# Patient Record
Sex: FEMALE | Race: WHITE | NOT HISPANIC OR LATINO | Employment: FULL TIME | URBAN - METROPOLITAN AREA
[De-identification: names, ages, dates, MRNs, and addresses within clinical notes are randomized per-mention and may not be internally consistent; named-entity substitution may affect disease eponyms.]

---

## 2017-01-03 ENCOUNTER — TELEPHONE (OUTPATIENT)
Dept: OBSTETRICS AND GYNECOLOGY | Facility: CLINIC | Age: 27
End: 2017-01-03

## 2017-01-03 NOTE — TELEPHONE ENCOUNTER
----- Message from Prerna Dias sent at 1/3/2017 10:46 AM CST -----  Contact: patient  Patient called and stated she is having issue with her Mirena. She would like to be worked in tomorrow. She can be contacted at [phone].    Thanks,  Prerna

## 2017-01-03 NOTE — TELEPHONE ENCOUNTER
"Pt states that her Mirena is "poking her." and then the phone disconnected. I need to room a pt right now and then I will call the pt back.  "

## 2017-01-04 NOTE — TELEPHONE ENCOUNTER
"Pt has appt scheduled this Friday 01/06/2017. Pt states that she has "some discomfort when she lays down to go to sleep, walks a long time, and has some lower back pain." Advised pt to discuss this at her upcoming appt. Pt voiced understanding.  "

## 2017-02-16 ENCOUNTER — OFFICE VISIT (OUTPATIENT)
Dept: OBSTETRICS AND GYNECOLOGY | Facility: CLINIC | Age: 27
End: 2017-02-16
Payer: MEDICAID

## 2017-02-16 VITALS
BODY MASS INDEX: 45.99 KG/M2 | HEIGHT: 67 IN | DIASTOLIC BLOOD PRESSURE: 80 MMHG | WEIGHT: 293 LBS | SYSTOLIC BLOOD PRESSURE: 132 MMHG

## 2017-02-16 DIAGNOSIS — B37.31 VULVOVAGINAL CANDIDIASIS: Primary | ICD-10-CM

## 2017-02-16 DIAGNOSIS — R30.0 DYSURIA: ICD-10-CM

## 2017-02-16 DIAGNOSIS — Z30.431 IUD SURVEILLANCE: ICD-10-CM

## 2017-02-16 LAB
AMORPH CRY UR QL COMP ASSIST: NORMAL
BACTERIA #/AREA URNS AUTO: NORMAL /HPF
BILIRUB UR QL STRIP: NEGATIVE
CLARITY UR REFRACT.AUTO: ABNORMAL
COLOR UR AUTO: YELLOW
GLUCOSE UR QL STRIP: ABNORMAL
HGB UR QL STRIP: ABNORMAL
KETONES UR QL STRIP: NEGATIVE
LEUKOCYTE ESTERASE UR QL STRIP: ABNORMAL
MICROSCOPIC COMMENT: NORMAL
NITRITE UR QL STRIP: NEGATIVE
PH UR STRIP: 6 [PH] (ref 5–8)
PROT UR QL STRIP: NEGATIVE
RBC #/AREA URNS AUTO: 0 /HPF (ref 0–4)
SP GR UR STRIP: 1.02 (ref 1–1.03)
URN SPEC COLLECT METH UR: ABNORMAL
UROBILINOGEN UR STRIP-ACNC: NEGATIVE EU/DL
WBC #/AREA URNS AUTO: 0 /HPF (ref 0–5)
YEAST UR QL AUTO: NORMAL

## 2017-02-16 PROCEDURE — 81001 URINALYSIS AUTO W/SCOPE: CPT

## 2017-02-16 PROCEDURE — 99999 PR PBB SHADOW E&M-EST. PATIENT-LVL II: CPT | Mod: PBBFAC,,, | Performed by: OBSTETRICS & GYNECOLOGY

## 2017-02-16 PROCEDURE — 99213 OFFICE O/P EST LOW 20 MIN: CPT | Mod: S$PBB,,, | Performed by: OBSTETRICS & GYNECOLOGY

## 2017-02-16 PROCEDURE — 87086 URINE CULTURE/COLONY COUNT: CPT

## 2017-02-16 PROCEDURE — 99212 OFFICE O/P EST SF 10 MIN: CPT | Mod: PBBFAC | Performed by: OBSTETRICS & GYNECOLOGY

## 2017-02-16 RX ORDER — FLUCONAZOLE 150 MG/1
150 TABLET ORAL ONCE
Qty: 1 TABLET | Refills: 0 | Status: SHIPPED | OUTPATIENT
Start: 2017-02-16 | End: 2017-02-16

## 2017-02-16 RX ORDER — LINAGLIPTIN 5 MG/1
TABLET, FILM COATED ORAL
Refills: 2 | COMMUNITY
Start: 2017-01-14 | End: 2018-06-05

## 2017-02-16 RX ORDER — OMEPRAZOLE 40 MG/1
40 CAPSULE, DELAYED RELEASE ORAL EVERY MORNING
COMMUNITY
End: 2018-06-05

## 2017-02-16 RX ORDER — TRAZODONE HYDROCHLORIDE 150 MG/1
150 TABLET ORAL NIGHTLY
Refills: 1 | COMMUNITY
Start: 2017-01-12 | End: 2018-06-05

## 2017-02-16 NOTE — PROGRESS NOTES
Subjective:       Patient ID: Haley Negron is a 26 y.o. female.    Chief Complaint:  string check (Mirena placed 2016.)      History of Present Illness  HPI  Presents for string check following Mirena IUD insertion 16.  Pt chose Mirena for heavy menstrual bleeding.  She denies any vaginal bleeding, but reports midline pelvic cramping, and low back discomfort along with vulvar burning when she urinates.  She also has an itchy, burning rash on the vulva.  Has been applying baby powder to the area with no relief.    GYN & OB History  No LMP recorded. Patient has had an implant.   Date of Last Pap: 2016    OB History    Para Term  AB SAB TAB Ectopic Multiple Living   0 0 0 0 0 0 0 0 0 0             Review of Systems  Review of Systems   Constitutional: Negative for fatigue, fever and unexpected weight change.   Gastrointestinal: Negative for abdominal pain, constipation, diarrhea, nausea and vomiting.   Genitourinary: Positive for dysuria (vulvar skin burns when urine touches it), genital sores (itchy, burning vulvar rash for several weeks) and pelvic pain (midline cramping). Negative for frequency, menorrhagia, menstrual problem, urgency, vaginal bleeding, vaginal discharge, vaginal pain, postcoital bleeding and vaginal odor.   Musculoskeletal: Positive for back pain (low back discomfort).           Objective:    Physical Exam:   Constitutional: She is oriented to person, place, and time. She appears well-developed and well-nourished. No distress.             Abdominal: Soft. She exhibits no distension and no mass. There is no tenderness. There is no rebound and no guarding. Hernia confirmed negative in the right inguinal area and confirmed negative in the left inguinal area.     Genitourinary: Pelvic exam was performed with patient supine. There is rash on the right labia. There is no tenderness, lesion or injury on the right labia. There is rash on the left labia. There is  no tenderness, lesion or injury on the left labia. Uterus is not deviated, not enlarged, not fixed, not tender and not experiencing uterine prolapse. Right adnexum displays no mass, no tenderness and no fullness. Left adnexum displays no mass, no tenderness and no fullness. There is erythema in the vagina. No tenderness, bleeding, rectocele, cystocele or unspecified prolapse of vaginal walls in the vagina. No foreign body in the vagina. No signs of injury around the vagina. Vaginal discharge (thick, white) found. Cervix exhibits no motion tenderness, no discharge and no friability. Additional cervical findings: IUD strings visualized  Genitourinary Comments: Diffuse, erythematous, maculopapular rash on entire vulva with satellite lesions consistent with yeast        Uterus Size: 6 cm       Neurological: She is alert and oriented to person, place, and time.     Psychiatric: She has a normal mood and affect.        urine dip: glucose, +1 blood  Assessment:        1. Vulvovaginal candidiasis    2. IUD surveillance    3. Dysuria                Plan:      Haley Powers was seen today for string check.    Diagnoses and all orders for this visit:    Vulvovaginal candidiasis  -     fluconazole (DIFLUCAN) 150 MG Tab; Take 1 tablet (150 mg total) by mouth once.    IUD surveillance    Dysuria  -     Urinalysis  -     Urine culture    Doing well with Mirena.  Patient was counseled today on vaginitis prevention including :  a. avoiding feminine products such as deoderant soaps, body wash, bubble bath, douches, scented toilet paper, deoderant tampons or pads, feminine wipes, chronic pad use, etc.  b. avoiding other vulvovaginal irritants such as long hot baths, humidity, tight, synthetic clothing, chlorine and sitting around in wet bathing suits  c. wearing cotton underwear, avoiding thong underwear and no underwear to bed  d. taking showers instead of baths and use a hair dryer on cool setting afterwards to dry  e. wearing  cotton to exercise and shower immediately after exercise and change clothes  Will f/u urine culture.

## 2017-02-16 NOTE — MR AVS SNAPSHOT
[facility]  [address]  [place] [address]  Phone: [phone]  Fax: [phone]                  Haley Negron   2017 10:30 AM   Office Visit    Description:  Female : 1990   Provider:  Gaby Longo MD   Department:  [facility]           Reason for Visit     string check           Diagnoses this Visit        Comments    Vulvovaginal candidiasis    -  Primary     IUD surveillance         Dysuria                To Do List           Goals (5 Years of Data)     None       These Medications        Disp Refills Start End    fluconazole (DIFLUCAN) 150 MG Tab 1 tablet 0 2017    Take 1 tablet (150 mg total) by mouth once. - Oral    Pharmacy: [facility]/pharmacy #[id number] [facility] [address] [facility] #: [phone]         [facility]     [facility] Nurse Care Line -  Assistance  Registered nurses in the [facility] Center provide clinical advisement, health education, appointment booking, and other advisory services.  Call for this free service at [phone].             Medications           Message regarding Medications     Verify the changes and/or additions to your medication regime listed below are the same as discussed with your clinician today.  If any of these changes or additions are incorrect, please notify your healthcare provider.        START taking these NEW medications        Refills    fluconazole (DIFLUCAN) 150 MG Tab 0    Sig: Take 1 tablet (150 mg total) by mouth once.    Class: Normal    Route: Oral           Verify that the below list of medications is an accurate representation of the medications you are currently taking.  If none reported, the list may be blank. If incorrect, please contact your healthcare provider. Carry this list with you in case of emergency.           Current Medications     dextroamphetamine-amphetamine (ADDERALL XR) 30 MG 24 hr capsule Take 30 mg by mouth every  "morning.    fluticasone (FLONASE) 50 mcg/actuation nasal spray 1 spray by Nasal route once daily.     levonorgestrel (MIRENA) 20 mcg/24 hr (5 years) IUD 1 each by Intrauterine route once.    metformin (GLUCOPHAGE) 500 MG tablet Take 500 mg by mouth 2 (two) times daily.    omeprazole (PRILOSEC) 40 MG capsule Take 40 mg by mouth every morning.     risperidone (RISPERDAL) 0.5 MG Tab Take 0.5 mg by mouth every evening.    TRADJENTA 5 mg Tab tablet TAKE 1 TABLET(S) BY MOUTH DAILY    trazodone (DESYREL) 150 MG tablet Take 150 mg by mouth every evening.    fluconazole (DIFLUCAN) 150 MG Tab Take 1 tablet (150 mg total) by mouth once.           Clinical Reference Information           Your Vitals Were     BP Height Weight BMI       132/80 5' 7" (1.702 m) 152.7 kg (336 lb 10.3 oz) 52.73 kg/m2       Blood Pressure          Most Recent Value    BP  132/80      Allergies as of 2/16/2017     Codeine    Pcn [Penicillins]    Pseudoephed-dm-acetaminophen      Immunizations Administered on Date of Encounter - 2/16/2017     None      Orders Placed During Today's Visit      Normal Orders This Visit    Urinalysis     Urine culture       Language Assistance Services     ATTENTION: Language assistance services are available, free of charge. Please call [phone].      ATENCIÓN: Si habla jay, tiene a alexander disposición servicios gratuitos de asistencia lingüística. Llame al [phone].     CHÚ Ý: N?u b?n nói Ti?ng Vi?t, có các d?ch v? h? tr? ngôn ng? mi?n phí dành cho b?n. G?i s? [phone].         [facility] complies with applicable Federal civil rights laws and does not discriminate on the basis of race, color, national origin, age, disability, or sex.        "

## 2017-02-18 LAB — BACTERIA UR CULT: NO GROWTH

## 2017-03-02 ENCOUNTER — TELEPHONE (OUTPATIENT)
Dept: OBSTETRICS AND GYNECOLOGY | Facility: CLINIC | Age: 27
End: 2017-03-02

## 2017-03-02 NOTE — TELEPHONE ENCOUNTER
----- Message from Dory Waddell sent at 3/2/2017 12:50 PM CST -----  Please call pt back at [phone] in regards to pt wants you to call in ghazal in [facility] to [facility] [place].

## 2017-03-03 ENCOUNTER — TELEPHONE (OUTPATIENT)
Dept: OBSTETRICS AND GYNECOLOGY | Facility: CLINIC | Age: 27
End: 2017-03-03

## 2017-03-03 RX ORDER — FLUCONAZOLE 150 MG/1
150 TABLET ORAL DAILY
Qty: 1 TABLET | Refills: 1 | Status: SHIPPED | OUTPATIENT
Start: 2017-03-03 | End: 2017-03-04

## 2017-03-03 NOTE — TELEPHONE ENCOUNTER
----- Message from Bailey Morales sent at 3/3/2017  8:21 AM CST -----  Pt is requesting a call from nurse to discuss a prescription for yeast.        Please call pt back at [phone]

## 2017-03-03 NOTE — TELEPHONE ENCOUNTER
Contacted pt. Pt states that she still has a yeast infection despite having been treated for yeast at her last appt 02/16/17.  Pt requesting to please send another Rx to her pharmacy. Please advise...

## 2017-04-24 ENCOUNTER — OFFICE VISIT (OUTPATIENT)
Dept: OBSTETRICS AND GYNECOLOGY | Facility: CLINIC | Age: 27
End: 2017-04-24
Payer: MEDICAID

## 2017-04-24 VITALS
HEIGHT: 67 IN | BODY MASS INDEX: 45.99 KG/M2 | DIASTOLIC BLOOD PRESSURE: 76 MMHG | WEIGHT: 293 LBS | SYSTOLIC BLOOD PRESSURE: 120 MMHG

## 2017-04-24 DIAGNOSIS — Z53.8 ATTEMPTED IUD REMOVAL, UNSUCCESSFUL: Primary | ICD-10-CM

## 2017-04-24 DIAGNOSIS — Z97.5 ATTEMPTED IUD REMOVAL, UNSUCCESSFUL: Primary | ICD-10-CM

## 2017-04-24 PROCEDURE — 99213 OFFICE O/P EST LOW 20 MIN: CPT | Mod: PBBFAC,PO | Performed by: ADVANCED PRACTICE MIDWIFE

## 2017-04-24 PROCEDURE — 99212 OFFICE O/P EST SF 10 MIN: CPT | Mod: S$PBB,,, | Performed by: ADVANCED PRACTICE MIDWIFE

## 2017-04-24 PROCEDURE — 99999 PR PBB SHADOW E&M-EST. PATIENT-LVL III: CPT | Mod: PBBFAC,,, | Performed by: ADVANCED PRACTICE MIDWIFE

## 2017-04-24 NOTE — MR AVS SNAPSHOT
[facility]  [address]  [place] [address]  Phone: [phone]  Fax: [phone]                  Haley Negron   2017 11:15 AM   Office Visit    Description:  Female : 1990   Provider:  Kimberly Toro CNM   [facility]           Reason for Visit     string check           Diagnoses this Visit        Comments    Attempted IUD removal, unsuccessful    -  Primary            To Do List           Future Appointments        Provider Department Dept Phone    2017 10:00 AM MD Xu Pacheco - OB/ -[phone]    2017 10:00 AM ULTRASOUND, OB-GYN XU Mcnair  OB/ -[phone]      Goals (5 Years of Data)     None      [facility]     [facility] -  Assistance  Unless otherwise directed by your provider, please contact [facility], our nurse care line that is available for  assistance.     Registered nurses in the [facility] provide: appointment scheduling, clinical advisement, health education, and other advisory services.  Call: [phone] (toll free)               Medications           Message regarding Medications     Verify the changes and/or additions to your medication regime listed below are the same as discussed with your clinician today.  If any of these changes or additions are incorrect, please notify your healthcare provider.        STOP taking these medications     risperidone (RISPERDAL) 0.5 MG Tab Take 0.5 mg by mouth every evening.           Verify that the below list of medications is an accurate representation of the medications you are currently taking.  If none reported, the list may be blank. If incorrect, please contact your healthcare provider. Carry this list with you in case of emergency.           Current Medications     dextroamphetamine-amphetamine (ADDERALL XR) 30 MG 24 hr capsule Take 30 mg by mouth every morning.    fluticasone  "(FLONASE) 50 mcg/actuation nasal spray 1 spray by Nasal route once daily.     levonorgestrel (MIRENA) 20 mcg/24 hr (5 years) IUD 1 each by Intrauterine route once.    metformin (GLUCOPHAGE) 500 MG tablet Take 500 mg by mouth 2 (two) times daily.    omeprazole (PRILOSEC) 40 MG capsule Take 40 mg by mouth every morning.     TRADJENTA 5 mg Tab tablet TAKE 1 TABLET(S) BY MOUTH DAILY    trazodone (DESYREL) 150 MG tablet Take 150 mg by mouth every evening.           Clinical Reference Information           Your Vitals Were     BP Height Weight BMI       120/76 5' 7" (1.702 m) 152.1 kg (335 lb 5.1 oz) 52.52 kg/m2       Blood Pressure          Most Recent Value    BP  120/76      Allergies as of 4/24/2017     Codeine    Pcn [Penicillins]    Pseudoephed-dm-acetaminophen      Immunizations Administered on Date of Encounter - 4/24/2017     None      Orders Placed During Today's Visit     Future Labs/Procedures Expected by Expires    US OB/GYN Procedure ([facility])  As directed 4/24/2018      Language Assistance Services     ATTENTION: Language assistance services are available, free of charge. Please call [phone].      ATENCIÓN: Si habla jay, tiene a alexander disposición servicios gratuitos de asistencia lingüística. Llame al [phone].     MICAELA Ý: N?u b?n nói Ti?ng Vi?t, có các d?ch v? h? tr? ngôn ng? mi?n phí dành cho b?n. G?i s? [phone].         [facility] complies with applicable Federal civil rights laws and does not discriminate on the basis of race, color, national origin, age, disability, or sex.        "

## 2017-04-24 NOTE — PROGRESS NOTES
Pt presents today to have IUD removed.  States she does not like the side effects.  States she still has irregular uterine bleeding and wants it removed.   Speculum placed. Unable to visualize IUD strings. Pt does not attempt to feel them. Will reschedule with MD for removal under u/s surveillance

## 2017-04-26 ENCOUNTER — TELEPHONE (OUTPATIENT)
Dept: OBSTETRICS AND GYNECOLOGY | Facility: CLINIC | Age: 27
End: 2017-04-26

## 2017-04-26 DIAGNOSIS — Z97.5 BREAKTHROUGH BLEEDING ASSOCIATED WITH INTRAUTERINE DEVICE (IUD): Primary | ICD-10-CM

## 2017-04-26 DIAGNOSIS — N92.1 BREAKTHROUGH BLEEDING ASSOCIATED WITH INTRAUTERINE DEVICE (IUD): Primary | ICD-10-CM

## 2017-04-26 NOTE — TELEPHONE ENCOUNTER
Will send short course of premarin instead of provera.  Provera likely won't help this patient because she has a Mirena IUD in place.

## 2017-04-26 NOTE — TELEPHONE ENCOUNTER
----- Message from Shyanne Forbes sent at 4/25/2017  2:12 PM CDT -----  Contact: pt  Pt requests refill on medication for stopping bleeding. Pt didn't know the name of the medication. Pt can be reached at [phone] (home)       ..  [facility]/pharmacy #[id number] - [place], [place] - [address] [facility]  [id number] [place] [address]  Phone: [phone] Fax: [phone]

## 2017-04-28 ENCOUNTER — TELEPHONE (OUTPATIENT)
Dept: OBSTETRICS AND GYNECOLOGY | Facility: CLINIC | Age: 27
End: 2017-04-28

## 2017-04-28 NOTE — TELEPHONE ENCOUNTER
Left a message for the patient informing her that Dr Keith is not going to be in the office on 5/11/17 so unfortunately she would need to reschedule her appointments.

## 2017-05-01 ENCOUNTER — TELEPHONE (OUTPATIENT)
Dept: OBSTETRICS AND GYNECOLOGY | Facility: CLINIC | Age: 27
End: 2017-05-01

## 2017-05-01 NOTE — TELEPHONE ENCOUNTER
----- Message from Columba Palomo sent at 4/28/2017  2:42 PM CDT -----  Contact: patient  Calling back to reschedule her appointment with Dr. Keith for a procedure. Please call patient ASAP @ [phone]. Thanks, jaren

## 2017-05-10 ENCOUNTER — TELEPHONE (OUTPATIENT)
Dept: OBSTETRICS AND GYNECOLOGY | Facility: CLINIC | Age: 27
End: 2017-05-10

## 2017-05-10 NOTE — TELEPHONE ENCOUNTER
----- Message from Ren Cruz sent at 5/10/2017 12:56 PM CDT -----  Contact: pt  Pt is calling nurse staff regarding the removal of Patient IUD. Pt call back [phone] thanks

## 2017-05-18 ENCOUNTER — PROCEDURE VISIT (OUTPATIENT)
Dept: OBSTETRICS AND GYNECOLOGY | Facility: CLINIC | Age: 27
End: 2017-05-18
Payer: MEDICAID

## 2017-05-18 DIAGNOSIS — Z30.432 ENCOUNTER FOR IUD REMOVAL: Primary | ICD-10-CM

## 2017-05-18 DIAGNOSIS — Z30.432 ENCOUNTER FOR IUD REMOVAL: ICD-10-CM

## 2017-05-18 PROCEDURE — 76856 US EXAM PELVIC COMPLETE: CPT | Mod: 26,S$PBB,, | Performed by: OBSTETRICS & GYNECOLOGY

## 2017-05-18 PROCEDURE — 58301 REMOVE INTRAUTERINE DEVICE: CPT | Mod: PBBFAC | Performed by: OBSTETRICS & GYNECOLOGY

## 2017-05-18 PROCEDURE — 58301 REMOVE INTRAUTERINE DEVICE: CPT | Mod: S$PBB,,, | Performed by: OBSTETRICS & GYNECOLOGY

## 2017-05-18 NOTE — PROCEDURES
Procedures   Haley Negron 26 y.o.  presents for Mirena IUD removal under u/s guidance as strings were not visible at last visit. Pt reports side effects    Office U/S: confirms IUD in situ in lower uterine segment.    : normal external genitalia. Speculum inserted. IUD strings well visible. Removed with Bee clamp. Pt tolerated procedure.

## 2018-05-12 ENCOUNTER — HOSPITAL ENCOUNTER (EMERGENCY)
Facility: HOSPITAL | Age: 28
Discharge: HOME | End: 2018-05-12
Payer: MEDICAID

## 2018-05-12 VITALS
HEIGHT: 67 IN | OXYGEN SATURATION: 98 % | DIASTOLIC BLOOD PRESSURE: 79 MMHG | BODY MASS INDEX: 44.73 KG/M2 | RESPIRATION RATE: 18 BRPM | WEIGHT: 285 LBS | HEART RATE: 72 BPM | SYSTOLIC BLOOD PRESSURE: 132 MMHG | TEMPERATURE: 99 F

## 2018-05-12 DIAGNOSIS — S61.210A LACERATION OF RIGHT INDEX FINGER WITHOUT FOREIGN BODY WITHOUT DAMAGE TO NAIL, INITIAL ENCOUNTER: Primary | ICD-10-CM

## 2018-05-12 DIAGNOSIS — Z23 NEED FOR TD VACCINE: ICD-10-CM

## 2018-05-12 PROCEDURE — 25000003 PHARM REV CODE 250: Performed by: PHYSICIAN ASSISTANT

## 2018-05-12 PROCEDURE — 99283 EMERGENCY DEPT VISIT LOW MDM: CPT | Mod: 25

## 2018-05-12 PROCEDURE — 63600175 PHARM REV CODE 636 W HCPCS: Performed by: PHYSICIAN ASSISTANT

## 2018-05-12 PROCEDURE — 12001 RPR S/N/AX/GEN/TRNK 2.5CM/<: CPT

## 2018-05-12 PROCEDURE — 90471 IMMUNIZATION ADMIN: CPT | Performed by: PHYSICIAN ASSISTANT

## 2018-05-12 PROCEDURE — 90715 TDAP VACCINE 7 YRS/> IM: CPT | Performed by: PHYSICIAN ASSISTANT

## 2018-05-12 RX ORDER — DOXYCYCLINE HYCLATE 100 MG
100 TABLET ORAL
Status: COMPLETED | OUTPATIENT
Start: 2018-05-12 | End: 2018-05-12

## 2018-05-12 RX ORDER — DOXYCYCLINE 100 MG/1
100 CAPSULE ORAL EVERY 12 HOURS
Qty: 19 CAPSULE | Refills: 0 | Status: SHIPPED | OUTPATIENT
Start: 2018-05-12 | End: 2018-05-22

## 2018-05-12 RX ADMIN — CLOSTRIDIUM TETANI TOXOID ANTIGEN (FORMALDEHYDE INACTIVATED), CORYNEBACTERIUM DIPHTHERIAE TOXOID ANTIGEN (FORMALDEHYDE INACTIVATED), BORDETELLA PERTUSSIS TOXOID ANTIGEN (GLUTARALDEHYDE INACTIVATED), BORDETELLA PERTUSSIS FILAMENTOUS HEMAGGLUTININ ANTIGEN (FORMALDEHYDE INACTIVATED), BORDETELLA PERTUSSIS PERTACTIN ANTIGEN, AND BORDETELLA PERTUSSIS FIMBRIAE 2/3 ANTIGEN 0.5 ML: 5; 2; 2.5; 5; 3; 5 INJECTION, SUSPENSION INTRAMUSCULAR at 10:05

## 2018-05-12 RX ADMIN — DOXYCYCLINE HYCLATE 100 MG: 100 TABLET, COATED ORAL at 10:05

## 2018-05-13 NOTE — ED PROVIDER NOTES
SCRIBE #1 NOTE: I, Shaun Hernandez, am scribing for, and in the presence of, BUSHRA Ledbetter. I have scribed the entire note.      History      Chief Complaint   Patient presents with    Laceration     laceration to right 2nd digit. cut with knife at work tonight       Review of patient's allergies indicates:   Allergen Reactions    Codeine     Pcn [penicillins]     Pseudoephed-dm-acetaminophen     Tussin dm cough medicine         HPI   HPI    5/12/2018, 9:53 PM   History obtained from the patient      History of Present Illness: Haley Negron is a 27 y.o. female patient who presents to the Emergency Department for R index fingertip laceration which occurred 1 hour pta. 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 Symptoms are constant and moderate in severity. No mitigating or exacerbating factors reported. No associated sxs reported. Patient denies any fever, chills, n/v, extremity weakness/numbness, and all other sxs at this time. No prior Tx pta. She has not had a TD vaccine in more than 5 years. No further complaints or concerns at this time.         Arrival mode: Personal vehicle    PCP: Lesley Deshpande NP       Past Medical History:  Past Medical History:   Diagnosis Date    ADHD (attention deficit hyperactivity disorder)     Bipolar 1 disorder        Past Surgical History:  Past Surgical History:   Procedure Laterality Date    NASAL POLYP SURGERY      TONSILLECTOMY      TYMPANOSTOMY TUBE PLACEMENT           Family History:  Family History   Problem Relation Age of Onset    Breast cancer Sister 29    COPD Neg Hx     Ovarian cancer Neg Hx        Social History:  Social History     Social History Main Topics    Smoking status: Never Smoker    Smokeless tobacco: Unknown    Alcohol use No    Drug use: No    Sexual activity: Yes     Partners: Male     Birth control/ protection: Implant       ROS   Review of  Systems   Constitutional: Negative for chills and fever.   HENT: Negative for sore throat.    Respiratory: Negative for shortness of breath.    Cardiovascular: Negative for chest pain.   Gastrointestinal: Negative for nausea and vomiting.   Genitourinary: Negative for dysuria.   Musculoskeletal: Negative for back pain.   Skin: Positive for wound (R index finger lac). Negative for rash.   Neurological: Negative for weakness and numbness.   Hematological: Does not bruise/bleed easily.   All other systems reviewed and are negative.    Physical Exam      Initial Vitals [05/12/18 2116]   BP Pulse Resp Temp SpO2   (!) 143/82 100 18 99.3 °F (37.4 °C) 97 %      MAP       102.33          Physical Exam  Nursing Notes and Vital Signs Reviewed.  Constitutional: Patient is in mild distress. Well-developed and well-nourished.  Head: Atraumatic. Normocephalic.  ENT: Mucous membranes are moist.   Cardiovascular: Regular rate. Regular rhythm.   Pulmonary/Chest: No respiratory distress.   Abdominal: Soft and non-distended. There is no tenderness.    Musculoskeletal: Moves all extremities. No obvious deformities.   Skin: There is an acute, very superficial 0.5 cm avulsion type laceration to the anterior aspect of distal phalanx of right index finger. Too small for sutures.   Neurological:  Alert, awake, and appropriate. Normal speech. No acute focal neurological deficits are appreciated.  Psychiatric: Normal affect. Good eye contact. Appropriate in content.    ED Course    Lac Repair  Date/Time: 5/12/2018 9:55 PM  Performed by: LESA RANDLE  Authorized by: BETHANY RAMIREZ JR   Consent Done: Not Needed  Body area: upper extremity  Location details: right index finger  Laceration length: 0.5 cm  Foreign bodies: no foreign bodies  Tendon involvement: none  Nerve involvement: none  Vascular damage: no  Patient sedated: no  Preparation: Patient was prepped and draped in the usual sterile fashion.  Irrigation solution: saline  Amount  "of cleaning: standard  Skin closure: glue  Technique: simple  Patient tolerance: Patient tolerated the procedure well with no immediate complications        ED Vital Signs:  Vitals:    05/12/18 2116 05/12/18 2250   BP: (!) 143/82 132/79   Pulse: 100 72   Resp: 18    Temp: 99.3 °F (37.4 °C) 99.2 °F (37.3 °C)   TempSrc: Oral Oral   SpO2: 97% 98%   Weight: 129.3 kg (285 lb)    Height: 5' 7" (1.702 m)               The Emergency Provider reviewed the vital signs and test results, which are outlined above.    ED Discussion     9:58 PM: Reassessed pt at this time.  Pt states her condition has improved at this time. Discussed with pt all pertinent ED information and results. Discussed pt dx and plan of tx. Gave pt all f/u and return to the ED instructions. All questions and concerns were addressed at this time. Pt expresses understanding of information and instructions, and is comfortable with plan to discharge. Pt is stable for discharge.    I discussed wound care precautions with patient and/or family/caretaker; specifically that all wounds have risk of infection despite efforts to cleanse and debride the wound; and there is a risk of an occult foreign body (and thus increased risk of infection) despite a negative examination.  I discussed with patient need to return for any signs of infection, specifically redness, increased pain, fever, drainage of pus, or any concern, immediately.      ED Medication(s):  Medications   Tdap vaccine injection 0.5 mL (0.5 mLs Intramuscular Given 5/12/18 2222)   doxycycline tablet 100 mg (100 mg Oral Given 5/12/18 2223)       Discharge Medication List as of 5/12/2018 10:48 PM      START taking these medications    Details   doxycycline (VIBRAMYCIN) 100 MG Cap Take 1 capsule (100 mg total) by mouth every 12 (twelve) hours., Starting Sat 5/12/2018, Until Tue 5/22/2018, Print             Follow-up Information     Lesley Deshpande NP. Schedule an appointment as soon as possible for a visit in " 2 days.    Specialty:  Cardiology  Why:  For wound re-check  Contact information:  [address]  [address]  [facility] [address]  [phone]             [facility]    Specialty:  Emergency Medicine  Why:  If symptoms worsen  Contact information:  [address]  [facility] [address] [phone]                   Medical Decision Making              Scribe Attestation:   Scribe #1: I performed the above scribed service and the documentation accurately describes the services I performed. I attest to the accuracy of the note.    Attending:   Physician Attestation Statement for Scribe #1: I, BUSHRA Ledbetter, personally performed the services described in this documentation, as scribed by Shaun Hernandez, in my presence, and it is both accurate and complete.          Clinical Impression       ICD-10-CM ICD-9-CM   1. Laceration of right index finger without foreign body without damage to nail, initial encounter S61.210A 883.0   2. Need for Td vaccine Z23 V06.5       Disposition:   Disposition: Discharged  Condition: Stable         BUSHRA Ledbetter-C  05/13/18 0253

## 2018-05-15 ENCOUNTER — TELEPHONE (OUTPATIENT)
Dept: OBSTETRICS AND GYNECOLOGY | Facility: CLINIC | Age: 28
End: 2018-05-15

## 2018-05-15 NOTE — TELEPHONE ENCOUNTER
Attempted to contact patient to reschedule 5/17 appt. No answer, unable to leave voice mail. Will try again later.

## 2018-05-16 NOTE — TELEPHONE ENCOUNTER
Spoke with pt. Assisted pt with rescheduling appt due to Dr. Longo having surgery. Pt verbalized understanding.

## 2018-06-05 ENCOUNTER — OFFICE VISIT (OUTPATIENT)
Dept: OBSTETRICS AND GYNECOLOGY | Facility: CLINIC | Age: 28
End: 2018-06-05
Payer: MEDICAID

## 2018-06-05 VITALS
SYSTOLIC BLOOD PRESSURE: 118 MMHG | DIASTOLIC BLOOD PRESSURE: 72 MMHG | WEIGHT: 293 LBS | HEIGHT: 67 IN | BODY MASS INDEX: 45.99 KG/M2

## 2018-06-05 DIAGNOSIS — N94.6 DYSMENORRHEA: Primary | ICD-10-CM

## 2018-06-05 PROCEDURE — 99213 OFFICE O/P EST LOW 20 MIN: CPT | Mod: S$PBB,,, | Performed by: OBSTETRICS & GYNECOLOGY

## 2018-06-05 PROCEDURE — 99213 OFFICE O/P EST LOW 20 MIN: CPT | Mod: PBBFAC | Performed by: OBSTETRICS & GYNECOLOGY

## 2018-06-05 PROCEDURE — 99999 PR PBB SHADOW E&M-EST. PATIENT-LVL III: CPT | Mod: PBBFAC,,, | Performed by: OBSTETRICS & GYNECOLOGY

## 2018-06-05 RX ORDER — CETIRIZINE HYDROCHLORIDE 10 MG/1
TABLET ORAL
COMMUNITY
Start: 2018-05-22 | End: 2019-09-08

## 2018-06-05 RX ORDER — NORGESTIMATE AND ETHINYL ESTRADIOL 0.25-0.035
1 KIT ORAL DAILY
Qty: 28 TABLET | Refills: 11 | Status: SHIPPED | OUTPATIENT
Start: 2018-06-05 | End: 2019-09-08

## 2018-06-05 RX ORDER — QUETIAPINE FUMARATE 100 MG/1
TABLET, FILM COATED ORAL
COMMUNITY
Start: 2018-05-22 | End: 2019-09-08

## 2018-06-05 RX ORDER — METFORMIN HYDROCHLORIDE 500 MG/1
TABLET ORAL
COMMUNITY

## 2018-06-05 RX ORDER — QUETIAPINE FUMARATE 50 MG/1
TABLET, FILM COATED ORAL
COMMUNITY
End: 2018-06-05

## 2018-06-05 RX ORDER — NAPROXEN SODIUM 550 MG/1
550 TABLET ORAL EVERY 12 HOURS PRN
Qty: 60 TABLET | Refills: 3 | Status: SHIPPED | OUTPATIENT
Start: 2018-06-05 | End: 2019-09-08

## 2018-06-05 RX ORDER — IBUPROFEN 800 MG/1
TABLET ORAL
COMMUNITY
End: 2018-06-05 | Stop reason: ALTCHOICE

## 2018-06-05 RX ORDER — OMEPRAZOLE 40 MG/1
CAPSULE, DELAYED RELEASE ORAL
COMMUNITY

## 2018-06-05 RX ORDER — DEXTROAMPHETAMINE SACCHARATE, AMPHETAMINE ASPARTATE MONOHYDRATE, DEXTROAMPHETAMINE SULFATE AND AMPHETAMINE SULFATE 7.5; 7.5; 7.5; 7.5 MG/1; MG/1; MG/1; MG/1
CAPSULE, EXTENDED RELEASE ORAL
COMMUNITY

## 2018-06-05 RX ORDER — METFORMIN HYDROCHLORIDE 1000 MG/1
1000 TABLET ORAL
COMMUNITY
Start: 2017-11-09 | End: 2018-06-05

## 2018-06-05 RX ORDER — FLUTICASONE PROPIONATE 50 MCG
2 SPRAY, SUSPENSION (ML) NASAL
COMMUNITY
Start: 2017-09-15 | End: 2018-09-15

## 2018-06-05 RX ORDER — ATORVASTATIN CALCIUM 20 MG/1
TABLET, FILM COATED ORAL
COMMUNITY

## 2018-06-05 NOTE — PROGRESS NOTES
Subjective:       Patient ID: Haley Negron is a 27 y.o. female.    Chief Complaint:  usltrasound follow up      History of Present Illness  HPI  Presents to follow-up results of ultrasound done at [facility] 2018 for dysmenorrhea.  Patient presented to [facility] clinic in January for dysmenorrhea.  Has regular, monthly periods, but has significant pelvic pain during her period.  Gets the best relief with naproxen, but sometimes has to use ultram to help her pain.  She has no pain at any other time of the month.  Pelvic ultrasound was normal, and showed a 1cm right ovarian follicular cyst.  She is mutually monogamous with a male partner for the last 4 years.  Denies any vaginal discharge, itching, or odor.    GYN & OB History  Patient's last menstrual period was 2018.   Date of Last Pap: 2016    OB History    Para Term  AB Living   0 0 0 0 0 0   SAB TAB Ectopic Multiple Live Births   0 0 0 0               Review of Systems  Review of Systems   Constitutional: Negative for fatigue, fever and unexpected weight change.   Gastrointestinal: Negative for abdominal pain, constipation, diarrhea, nausea and vomiting.   Genitourinary: Positive for dysmenorrhea. Negative for dyspareunia, dysuria, frequency, menorrhagia, menstrual problem, pelvic pain, urgency, vaginal bleeding, vaginal discharge, vaginal pain, postcoital bleeding and vaginal odor.           Objective:    Physical Exam:   Constitutional: She is oriented to person, place, and time. She appears well-developed and well-nourished. No distress.       Cardiovascular: Exam reveals no clubbing, no cyanosis and no edema.          Abdominal: Soft. She exhibits no distension and no mass. There is no tenderness. There is no rebound and no guarding.                 Neurological: She is alert and oriented to person, place, and time.    Skin: No cyanosis. Nails show no clubbing.    Psychiatric: She has a normal mood and affect. Her behavior is  normal.          Assessment:        1. Dysmenorrhea                Plan:      Haley Powers was seen today for usltrasound follow up.    Diagnoses and all orders for this visit:    Dysmenorrhea  -     norgestimate-ethinyl estradiol (ORTHO-CYCLEN) 0.25-35 mg-mcg per tablet; Take 1 tablet by mouth once daily.  -     naproxen sodium (ANAPROX) 550 MG tablet; Take 1 tablet (550 mg total) by mouth every 12 (twelve) hours as needed (pain).    Reviewed pathophysiology of dysmenorrhea.  Reviewed that first-line treatment for her symptoms is NSAID's.   Next line of therapy is hormonal management with OCP's, patch, nuva ring, depo provera, or Mirena.  Patient wants OCP's.  Discussed common side effects including mood changes, mild nausea, and irregular bleeding when starting a new pill.  Reviewed slight increased risk of VTE's while on a combination OCP.  Counseled on proper use of the pill.  All questions answered, and hand-out given.  RTC 3 months to follow-up symptoms.

## 2018-06-05 NOTE — PATIENT INSTRUCTIONS
Painful Menstrual Periods (Dysmenorrhea)    Dysmenorrhea is the term used to describe painful menstrual periods.  The uterus is a muscle. Normally, chemicals called prostaglandins cause the uterus to contract during your period. The contractions push out the build-up of tissue that occurs each month inside the uterus. If the contraction is very strong, it can cause pain. The pain may feel like cramping in the lower abdomen, lower back, or thighs. In severe cases, you may have other symptoms as well. These can include nausea, vomiting, loose stools, sweating, or dizziness.  There are 2 types of dysmenorrhea:  Primary dysmenorrhea refers to common menstrual cramps. It may begin 1 or 2 years after you first get your period. It may get better or go away as you get older or when you have a baby. The cramps are most often felt just before, or on the day of your period. They may last 1 to 3 days. Treatment is with medicines and comfort measures as described below (see the Home care section).  Secondary dysmenorrhea may start later in life. It describes menstrual pain that occurs due to underlying health problem. The pain may last longer than common menstrual cramps. It may also worsen over time. Some problems that can lead to secondary dysmenorrhea include:   · Pelvic inflammatory disease (PID): Infection that involves the female reproductive organs, such as the uterus and fallopian tubes  · Fibroids: Benign growths within the wall of the uterus (not cancer)  · Endometriosis: Tissue that normally only lines the uterus also grows outside of it (because the abnormal tissue also swells and bleeds each month, it can cause pain)  Once the cause of secondary dysmenorrhea is found, it can be treated. Your healthcare provider will discuss options with you as needed. Your care may also include some of the treatments described below (see the Home care section).  Home care  Medicines  Certain medicines can be used to help  relieve or prevent menstrual pain and cramping. These can include:  · Nonsteroidal anti-inflammatory drugs (NSAIDs), such as ibuprofen  · Prescription pain medicine, if needed  · Hormone therapy (this includes most methods of hormonal birth control such as pills, shots, or a hormone-releasing IUD)  General care  To help relieve pain and cramping, try these tips:  · Rest as needed.  · Apply a heating pad to the lower belly or back as directed. A warm bath or massage to these areas may also help.  · Exercise regularly. Many women find that being more active each week helps reduce pain and cramping.  · Ask your healthcare provider for advice about other treatments you can try to help control pain and cramping.  Follow-up care  Follow up with your healthcare provider as advised.  When to seek medical advice  Call your healthcare provider right away if any of these occur:  · Fever of 100.4°F (38°C) or higher, or as directed by your provider  · Pain or cramping worsens or doesnt improve with medicine  · Pain or cramping lasts longer than usual or occurs between periods  · Unusual vaginal discharge between periods  · Bleeding becomes heavy (soaking more than 1 pad or tampon every hour for 3 hours)  · Passage of pink or gray tissue from the vagina  Date Last Reviewed: 6/11/2015  © 7065-1332 [employer]. [address] [place] [address], [place] [address]. All rights reserved. This information is not intended as a substitute for professional medical care. Always follow your healthcare professional's instructions.

## 2019-01-31 ENCOUNTER — TELEPHONE (OUTPATIENT)
Dept: OBSTETRICS AND GYNECOLOGY | Facility: CLINIC | Age: 29
End: 2019-01-31

## 2019-01-31 NOTE — TELEPHONE ENCOUNTER
----- Message from Caryl Tan sent at 1/31/2019 12:46 PM CST -----  Contact: pt  Pt needs to schedule appointment for Birth control (mirena) .... Call back: [phone]

## 2019-06-10 ENCOUNTER — TELEPHONE (OUTPATIENT)
Dept: OBSTETRICS AND GYNECOLOGY | Facility: CLINIC | Age: 29
End: 2019-06-10

## 2019-06-10 NOTE — TELEPHONE ENCOUNTER
----- Message from Bailey Morales sent at 6/10/2019  3:12 PM CDT -----  Pt is requesting a call from nurse to discuss scheduling an apt.          Please call pt back [phone]

## 2019-06-10 NOTE — TELEPHONE ENCOUNTER
Returned call to patient.  She requested a wwe with Dr. Gaby Longo.  Appt scheduled 07/01/19 at 9:30 am, she confirmed appt and location.

## 2019-09-08 ENCOUNTER — HOSPITAL ENCOUNTER (EMERGENCY)
Facility: HOSPITAL | Age: 29
Discharge: HOME | End: 2019-09-09
Attending: EMERGENCY MEDICINE
Payer: MEDICAID

## 2019-09-08 DIAGNOSIS — M79.644 THUMB PAIN, RIGHT: Primary | ICD-10-CM

## 2019-09-08 PROCEDURE — 81025 URINE PREGNANCY TEST: CPT

## 2019-09-08 PROCEDURE — 25000003 PHARM REV CODE 250: Performed by: EMERGENCY MEDICINE

## 2019-09-08 PROCEDURE — 86703 HIV-1/HIV-2 1 RESULT ANTBDY: CPT

## 2019-09-08 PROCEDURE — 99283 EMERGENCY DEPT VISIT LOW MDM: CPT | Mod: 25

## 2019-09-08 PROCEDURE — 80307 DRUG TEST PRSMV CHEM ANLYZR: CPT

## 2019-09-08 PROCEDURE — 80320 DRUG SCREEN QUANTALCOHOLS: CPT

## 2019-09-08 RX ORDER — ACETAMINOPHEN 500 MG
1000 TABLET ORAL
Status: COMPLETED | OUTPATIENT
Start: 2019-09-08 | End: 2019-09-08

## 2019-09-08 RX ADMIN — ACETAMINOPHEN 1000 MG: 500 TABLET ORAL at 11:09

## 2019-09-09 VITALS
DIASTOLIC BLOOD PRESSURE: 71 MMHG | OXYGEN SATURATION: 100 % | BODY MASS INDEX: 47.09 KG/M2 | SYSTOLIC BLOOD PRESSURE: 130 MMHG | TEMPERATURE: 99 F | RESPIRATION RATE: 16 BRPM | WEIGHT: 293 LBS | HEART RATE: 82 BPM | HEIGHT: 66 IN

## 2019-09-09 LAB
AMPHET+METHAMPHET UR QL: NEGATIVE
B-HCG UR QL: NEGATIVE
BARBITURATES UR QL SCN>200 NG/ML: NEGATIVE
BENZODIAZ UR QL SCN>200 NG/ML: NEGATIVE
BZE UR QL SCN: NEGATIVE
CANNABINOIDS UR QL SCN: NEGATIVE
CREAT UR-MCNC: 198.3 MG/DL (ref 15–325)
ETHANOL SERPL-MCNC: <10 MG/DL
HIV 1+2 AB+HIV1 P24 AG SERPL QL IA: NEGATIVE
METHADONE UR QL SCN>300 NG/ML: NEGATIVE
OPIATES UR QL SCN: NEGATIVE
PCP UR QL SCN>25 NG/ML: NEGATIVE
TOXICOLOGY INFORMATION: NORMAL

## 2019-09-09 RX ORDER — NAPROXEN 500 MG/1
500 TABLET ORAL 2 TIMES DAILY PRN
Qty: 20 TABLET | Refills: 0 | Status: SHIPPED | OUTPATIENT
Start: 2019-09-09 | End: 2019-09-19

## 2019-09-09 NOTE — ED PROVIDER NOTES
SCRIBE #1 NOTE: I, Ynes Crouch, am scribing for, and in the presence of, Arjun Pitt MD. I have scribed the entire note.      History      Chief Complaint   Patient presents with    Hand Injury     states right hand got caught in doors at work. Pain to right thumb with limited ROM. Fingers good ROM noted, strong radial pulse       Review of patient's allergies indicates:   Allergen Reactions    Codeine Swelling and Anaphylaxis    Pcn [penicillins] Swelling and Anaphylaxis    Chlorpheniramine-pseudoeph-dm Hives    Guaifenesin Swelling    Loratadine     Pseudoephed-dm-acetaminophen     Tramadol Swelling    Tussin dm cough medicine         HPI   HPI    9/9/2019, 11:20 PM   History obtained from the patient      History of Present Illness: Haley Negron is a 28 y.o. female patient who presents to the Emergency Department for evaluation of Right hand injury which onset today when she had her hand smashed into a grocery tote and door while at work. Symptoms are constant and moderate in severity. Pt is c/o of pain to her thumb with decreased ROM. No mitigating or exacerbating factors reported. No associated sxs. Patient denies any fever, extremity numbness/weakness, laceration, and all other sxs at this time. No prior Tx. No further complaints or concerns at this time. She requests drug and EtOH which her work needs for injuries.       Arrival mode: Personal vehicle      PCP: Lesley Deshpande NP       Past Medical History:  Past Medical History:   Diagnosis Date    ADHD (attention deficit hyperactivity disorder)     Bipolar 1 disorder        Past Surgical History:  Past Surgical History:   Procedure Laterality Date    NASAL POLYP SURGERY      TONSILLECTOMY      TYMPANOSTOMY TUBE PLACEMENT           Family History:  Family History   Problem Relation Age of Onset    Breast cancer Sister 29    COPD Neg Hx     Ovarian cancer Neg Hx        Social History:  Social History     Tobacco Use     Smoking status: Never Smoker    Smokeless tobacco: Never Used   Substance and Sexual Activity    Alcohol use: No    Drug use: No    Sexual activity: Yes     Partners: Male     Birth control/protection: None       ROS   Review of Systems   Constitutional: Negative for fever.   HENT: Negative for sore throat.    Respiratory: Negative for shortness of breath.    Cardiovascular: Negative for chest pain.   Gastrointestinal: Negative for nausea.   Genitourinary: Negative for dysuria.   Musculoskeletal: Negative for back pain.        (+) Right thumb pain   Skin: Negative for rash and wound.   Neurological: Negative for weakness and numbness.   Hematological: Does not bruise/bleed easily.   All other systems reviewed and are negative.    Physical Exam      Initial Vitals [09/08/19 2145]   BP Pulse Resp Temp SpO2   136/78 85 20 98.7 °F (37.1 °C) 100 %      MAP       --          Physical Exam  Nursing Notes and Vital Signs Reviewed.  Constitutional: Patient is in no acute distress. Well-developed and well-nourished.  Head: Atraumatic. Normocephalic.  Eyes: PERRL. EOM intact. Conjunctivae are not pale. No scleral icterus.  ENT: Mucous membranes are moist.  Neck: Supple. Full ROM. No lymphadenopathy.  Cardiovascular: Regular rate. Regular rhythm. No murmurs, rubs, or gallops. Distal pulses are 2+ and symmetric.  Pulmonary/Chest: No respiratory distress. Clear to auscultation bilaterally. No wheezing or rales.  Abdominal: Soft and non-distended.  There is no tenderness.  No rebound, guarding, or rigidity.  Genitourinary: No CVA tenderness  Musculoskeletal: Moves all extremities. No obvious deformities. No edema.  Right Hand: No obvious deformity. There is thumb proximal phalanx tenderness. Full flexion and extension of the wrist. Radial, median, and ulnar nerves are intact. Radial and ulnar pulses are 2+. Normal capillary refill.  Distal sensation is intact. NVI distally.   Skin: Warm and dry.  Neurological:  Alert, awake,  "and appropriate.  Normal speech.  No acute focal neurological deficits are appreciated.  Psychiatric: Normal affect. Good eye contact. Appropriate in content.    ED Course    Procedures  ED Vital Signs:  Vitals:    09/08/19 2145 09/09/19 0107   BP: 136/78 130/71   Pulse: 85 82   Resp: 20 16   Temp: 98.7 °F (37.1 °C) 98.5 °F (36.9 °C)   TempSrc: Oral Oral   SpO2: 100% 100%   Weight: (!) 151.4 kg (333 lb 12.4 oz)    Height: 5' 6" (1.676 m)        Abnormal Lab Results:  Labs Reviewed   HIV 1 / 2 ANTIBODY   DRUG SCREEN PANEL, URINE EMERGENCY   PREGNANCY TEST, URINE RAPID   ALCOHOL,MEDICAL (ETHANOL)        All Lab Results:  Results for orders placed or performed during the hospital encounter of 09/08/19   HIV 1/2 Ag/Ab (4th Gen)   Result Value Ref Range    HIV 1/2 Ag/Ab Negative Negative   Drug screen panel, emergency   Result Value Ref Range    Benzodiazepines Negative     Methadone metabolites Negative     Cocaine (Metab.) Negative     Opiate Scrn, Ur Negative     Barbiturate Screen, Ur Negative     Amphetamine Screen, Ur Negative     THC Negative     Phencyclidine Negative     Creatinine, Random Ur 198.3 15.0 - 325.0 mg/dL    Toxicology Information SEE COMMENT    Rapid Pregnancy, Urine   Result Value Ref Range    Preg Test, Ur Negative    Ethanol   Result Value Ref Range    Alcohol, Medical, Serum <10 <10 mg/dL         Imaging Results:  Imaging Results          X-Ray Hand 3 view Right (Final result)  Result time 09/08/19 23:31:01    Final result by Aminta Bello MD (09/08/19 23:31:01)                 Impression:      Unremarkable exam.      Electronically signed by: Aminta Bello  Date:    09/08/2019  Time:    23:31             Narrative:    EXAMINATION:  XR HAND COMPLETE 3 VIEW RIGHT    CLINICAL HISTORY:  right thumb trauma;    COMPARISON:  None    FINDINGS:  Anatomic alignment.  No fracture or joint effusion visualized.                                        The Emergency Provider reviewed the " vital signs and test results, which are outlined above.    ED Discussion     1:04 AM: Reassessed pt at this time.  Pt states her condition has improved at this time. Discussed with pt all pertinent ED information and results. Discussed pt dx and plan of tx. Gave pt all f/u and return to the ED instructions. All questions and concerns were addressed at this time. Pt expresses understanding of information and instructions, and is comfortable with plan to discharge. Pt is stable for discharge.    I discussed with patient and/or family/caretaker that evaluation in the ED does not suggest any emergent or life threatening medical conditions requiring immediate intervention beyond what was provided in the ED, and I believe patient is safe for discharge.  Regardless, an unremarkable evaluation in the ED does not preclude the development or presence of a serious of life threatening condition. As such, patient was instructed to return immediately for any worsening or change in current symptoms.      ED Medication(s):  Medications   acetaminophen tablet 1,000 mg (1,000 mg Oral Given 9/8/19 2317)     Discharge Medication List as of 9/9/2019 12:48 AM      START taking these medications    Details   naproxen (NAPROSYN) 500 MG tablet Take 1 tablet (500 mg total) by mouth 2 (two) times daily as needed (pain)., Starting Mon 9/9/2019, Until Thu 9/19/2019, Print             Follow-up Information     Lesley Deshpande NP.    Specialty:  Cardiology  Why:  As needed, If symptoms worsen  Contact information:  [address] [facility]  [address]  [facility] [address]  [phone]                     Medical Decision Making    Medical Decision Making:   Clinical Tests:   Lab Tests: Ordered and Reviewed  Radiological Study: Ordered and Reviewed           Scribe Attestation:   Scribe #1: I performed the above scribed service and the documentation accurately describes the services I performed. I attest to the accuracy of the note.    Attending:    Physician Attestation Statement for Scribe #1: I, Arjun Pitt MD, personally performed the services described in this documentation, as scribed by Ynes Crouch, in my presence, and it is both accurate and complete.          Clinical Impression       ICD-10-CM ICD-9-CM   1. Thumb pain, right M79.644 729.5       Disposition:   Disposition: Discharged  Condition: Stable         Arjun Pitt MD  09/09/19 1211

## 2020-03-10 ENCOUNTER — TELEPHONE (OUTPATIENT)
Dept: OBSTETRICS AND GYNECOLOGY | Facility: CLINIC | Age: 30
End: 2020-03-10

## 2020-03-10 NOTE — TELEPHONE ENCOUNTER
Attempted to return call, no answer.  Appt rescheduled according to request via [facility].  Made aware of rescheduled appt via [facility].

## 2020-03-10 NOTE — TELEPHONE ENCOUNTER
----- Message from Earnest Morales sent at 3/10/2020  1:48 PM CDT -----  Contact: Self- [phone]  Would like a call from nurse in regards to rescheduling an appt . Please call back at [phone].       Thank You,   Earnest Morales

## 2020-05-18 ENCOUNTER — PATIENT MESSAGE (OUTPATIENT)
Dept: OBSTETRICS AND GYNECOLOGY | Facility: CLINIC | Age: 30
End: 2020-05-18

## 2020-10-18 ENCOUNTER — NURSE TRIAGE (OUTPATIENT)
Dept: ADMINISTRATIVE | Facility: CLINIC | Age: 30
End: 2020-10-18

## 2020-10-18 NOTE — TELEPHONE ENCOUNTER
Pt needs to tknow if she needs to go to ED or if she can wit to see GURMEET    Passing soft-ball size blood clots x 3 days, and started bleeding x 5 days ago(10/14), saturating 5-6 pads/day and doubling up on them. Saturating 1 pad every 2 hours but can anticipate when she is about to pass a clot. Denies pain or cramping. Pt states she feels normal but admits after passing blood clot she feels tired but not like she is about to pass out. Advised per protocol, will f/u with OBGYN tomorrow and call us back for worsening s/s.     Reason for Disposition   Periods with > 6 soaked pads or tampons per day    Additional Information   Negative: Shock suspected (e.g., cold/pale/clammy skin, too weak to stand, low BP, rapid pulse)   Negative: Difficult to awaken or acting confused  (e.g., disoriented, slurred speech)   Negative: Passed out (i.e., lost consciousness, collapsed and was not responding)   Negative: Sounds like a life-threatening emergency to the triager   Negative: SEVERE abdominal pain   Negative: SEVERE dizziness (e.g., unable to stand, requires support to walk, feels like passing out now)   Negative: SEVERE vaginal bleeding (i.e., soaking 2 pads or tampons per hour and present 2 or more hours)   Negative: Patient sounds very sick or weak to the triager   Negative: MODERATE vaginal bleeding (i.e., soaking 1 pad or tampon per hour and present > 6 hours)   Negative: [1] Constant abdominal pain AND [2] present > 2 hours   Negative: Pale skin (pallor) of new onset or worsening   Negative: Passed tissue (e.g., gray-white)   Negative: Taking Coumadin (warfarin) or other strong blood thinner, or known bleeding disorder (e.g., thrombocytopenia)   Negative: [1] Skin bruises or nosebleed AND [2] not caused by an injury   Negative: [1] Periods with > 6 soaked pads or tampons per day AND [2] last > 7 days   Negative: [1] Bleeding or spotting after procedure (e.g., biopsy) or pelvic examination (e.g., pap  smear) AND [2] lasts > 7 days   Negative: [1] Bleeding or spotting occurs after sex (Exception: first intercourse) AND [2] lasts > 7 days    Protocols used: ST VAGINAL BLEEDING - [id number]

## 2020-10-27 ENCOUNTER — PATIENT MESSAGE (OUTPATIENT)
Dept: OBSTETRICS AND GYNECOLOGY | Facility: CLINIC | Age: 30
End: 2020-10-27

## 2021-02-19 ENCOUNTER — TELEPHONE (OUTPATIENT)
Dept: NEUROLOGY | Facility: CLINIC | Age: 31
End: 2021-02-19

## 2021-03-24 ENCOUNTER — TELEPHONE (OUTPATIENT)
Dept: OBSTETRICS AND GYNECOLOGY | Facility: CLINIC | Age: 31
End: 2021-03-24

## 2021-03-28 ENCOUNTER — HOSPITAL ENCOUNTER (EMERGENCY)
Facility: HOSPITAL | Age: 31
Discharge: HOME | End: 2021-03-28
Attending: EMERGENCY MEDICINE
Payer: COMMERCIAL

## 2021-03-28 VITALS
WEIGHT: 290.44 LBS | SYSTOLIC BLOOD PRESSURE: 136 MMHG | BODY MASS INDEX: 45.58 KG/M2 | HEART RATE: 104 BPM | DIASTOLIC BLOOD PRESSURE: 88 MMHG | HEIGHT: 67 IN | TEMPERATURE: 99 F | RESPIRATION RATE: 20 BRPM | OXYGEN SATURATION: 98 %

## 2021-03-28 DIAGNOSIS — R07.89 RIGHT-SIDED CHEST WALL PAIN: ICD-10-CM

## 2021-03-28 DIAGNOSIS — M25.531 RIGHT WRIST PAIN: ICD-10-CM
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
## 2021-03-28 DIAGNOSIS — M79.641 RIGHT HAND PAIN: ICD-10-CM

## 2021-03-28 PROCEDURE — 99283 EMERGENCY DEPT VISIT LOW MDM: CPT | Mod: 25

## 2021-03-28 PROCEDURE — 25000003 PHARM REV CODE 250: Performed by: NURSE PRACTITIONER

## 2021-03-28 RX ORDER — KETOROLAC TROMETHAMINE 10 MG/1
10 TABLET, FILM COATED ORAL
Status: COMPLETED | OUTPATIENT
Start: 2021-03-28 | End: 2021-03-28

## 2021-03-28 RX ORDER — KETOROLAC TROMETHAMINE 10 MG/1
10 TABLET, FILM COATED ORAL EVERY 8 HOURS PRN
Qty: 15 TABLET | Refills: 0 | Status: SHIPPED | OUTPATIENT
Start: 2021-03-28 | End: 2021-04-02

## 2021-03-28 RX ADMIN — KETOROLAC TROMETHAMINE 10 MG: 10 TABLET, FILM COATED ORAL at 04:03

## 2021-05-06 ENCOUNTER — PATIENT MESSAGE (OUTPATIENT)
Dept: RESEARCH | Facility: HOSPITAL | Age: 31
End: 2021-05-06

## 2021-05-18 ENCOUNTER — TELEPHONE (OUTPATIENT)
Dept: OBSTETRICS AND GYNECOLOGY | Facility: CLINIC | Age: 31
End: 2021-05-18

## 2022-02-24 ENCOUNTER — PATIENT MESSAGE (OUTPATIENT)
Dept: ADMINISTRATIVE | Facility: OTHER | Age: 32
End: 2022-02-24
Payer: MEDICAID

## 2022-07-07 ENCOUNTER — PATIENT MESSAGE (OUTPATIENT)
Dept: ADMINISTRATIVE | Facility: OTHER | Age: 32
End: 2022-07-07
Payer: MEDICAID

## 2023-04-24 ENCOUNTER — TELEPHONE (OUTPATIENT)
Dept: PHYSICAL MEDICINE AND REHAB | Facility: CLINIC | Age: 33
End: 2023-04-24
Payer: COMMERCIAL

## 2023-04-24 NOTE — TELEPHONE ENCOUNTER
Patient has appointment with PMR for spine, nerve issues. Reached out to patient do clarify reason for visit. No answer, LVM with my contact information for patient to call back.  RD

## 2023-05-02 ENCOUNTER — TELEPHONE (OUTPATIENT)
Dept: PHYSICAL MEDICINE AND REHAB | Facility: CLINIC | Age: 33
End: 2023-05-02
Payer: COMMERCIAL

## 2023-05-02 ENCOUNTER — PATIENT MESSAGE (OUTPATIENT)
Dept: PHYSICAL MEDICINE AND REHAB | Facility: CLINIC | Age: 33
End: 2023-05-02
Payer: COMMERCIAL

## 2023-05-02 NOTE — TELEPHONE ENCOUNTER
Called patient from PMR schedule, patient incorrectly scheduled. No answer, LVM and message in [facility] for patient to call back and get scheduled with IPM.  Lily Magana RN

## 2023-05-15 ENCOUNTER — TELEPHONE (OUTPATIENT)
Dept: OBSTETRICS AND GYNECOLOGY | Facility: CLINIC | Age: 33
End: 2023-05-15
Payer: MEDICAID

## 2024-12-26 ENCOUNTER — HOSPITAL ENCOUNTER (EMERGENCY)
Facility: HOSPITAL | Age: 34
Discharge: HOME | End: 2024-12-26
Attending: FAMILY MEDICINE
Payer: MEDICAID

## 2024-12-26 VITALS
HEART RATE: 125 BPM | SYSTOLIC BLOOD PRESSURE: 143 MMHG | RESPIRATION RATE: 18 BRPM | DIASTOLIC BLOOD PRESSURE: 88 MMHG | OXYGEN SATURATION: 100 % | TEMPERATURE: 98 F

## 2024-12-26 DIAGNOSIS — A60.00 GENITAL HERPES SIMPLEX, UNSPECIFIED SITE: Primary | ICD-10-CM

## 2024-12-26 LAB
HCV AB SERPL QL IA: NEGATIVE
HEP C VIRUS HOLD SPECIMEN: NORMAL
HIV 1+2 AB+HIV1 P24 AG SERPL QL IA: NEGATIVE

## 2024-12-26 PROCEDURE — 86803 HEPATITIS C AB TEST: CPT | Performed by: PHYSICIAN ASSISTANT

## 2024-12-26 PROCEDURE — 99284 EMERGENCY DEPT VISIT MOD MDM: CPT

## 2024-12-26 PROCEDURE — 87389 HIV-1 AG W/HIV-1&-2 AB AG IA: CPT | Performed by: PHYSICIAN ASSISTANT

## 2024-12-26 RX ORDER — TRAMADOL HYDROCHLORIDE 50 MG/1
50 TABLET ORAL EVERY 6 HOURS PRN
Qty: 15 TABLET | Refills: 0 | Status: SHIPPED | OUTPATIENT
Start: 2024-12-26

## 2024-12-26 RX ORDER — FLUCONAZOLE 150 MG/1
150 TABLET ORAL DAILY
Qty: 1 TABLET | Refills: 1 | Status: SHIPPED | OUTPATIENT
Start: 2024-12-26

## 2024-12-26 RX ORDER — ACYCLOVIR 400 MG/1
400 TABLET ORAL
COMMUNITY
Start: 2024-12-23 | End: 2025-01-06

## 2024-12-26 NOTE — ED PROVIDER NOTES
History      Chief Complaint   Patient presents with    Dysuria     Pt. C/o having burning with urination starting yesterday after taking a bath with epsom salt and lavender yesterday, pt also states she has been having an abscess on her left buttock for the past week.         Review of patient's allergies indicates:   Allergen Reactions    Codeine Swelling and Anaphylaxis    Pcn [penicillins] Swelling and Anaphylaxis    Chlorpheniramine-pseudoeph-dm Hives    Guaifenesin Swelling    Loratadine     Pseudoephed-dm-acetaminophen     Tramadol Swelling    Tussin dm cough medicine         HPI   HPI    12/26/2024, 1:44 PM   History obtained from the patient      History of Present Illness: Haley Negron is a 34 y.o. female patient who presents to the Emergency Department for blisters to anal area for 5-6 days.  She was seen at another ER, prescribed acyclovir, doxycycline, and cefdinir.  She is now having yeast infection symptoms.    No further complaints or concerns at this time.           PCP: Lesley Deshpande NP       Past Medical History:  Past Medical History:   Diagnosis Date    ADHD (attention deficit hyperactivity disorder)     Bipolar 1 disorder          Past Surgical History:  Past Surgical History:   Procedure Laterality Date    NASAL POLYP SURGERY      TONSILLECTOMY      TYMPANOSTOMY TUBE PLACEMENT             Family History:  Family History   Problem Relation Name Age of Onset    Breast cancer Sister 1/2 sister on dad's side 29    COPD Neg Hx      Ovarian cancer Neg Hx             Social History:  Social History     Tobacco Use    Smoking status: Never    Smokeless tobacco: Never   Substance and Sexual Activity    Alcohol use: No    Drug use: No    Sexual activity: Yes     Partners: Male     Birth control/protection: None       ROS     Review of Systems   Genitourinary:  Positive for dysuria.       Physical Exam      Initial Vitals [12/26/24 1136]   BP Pulse Resp Temp SpO2   (!) 143/88 (!) 125 18  97.6 °F (36.4 °C) 100 %      MAP       --         Physical Exam  Vital signs and nursing notes reviewed.  Constitutional: Patient is in NAD. Awake and alert. Well-developed and well-nourished.  Head: Atraumatic. Normocephalic.  Eyes:  EOM intact. Conjunctivae nl. No scleral icterus.  ENT: Mucous membranes are moist.   Neck: Supple.  No meningismus  Cardiovascular: Regular rate and rhythm. No murmurs, rubs, or gallops.   Pulmonary/Chest: No respiratory distress. Clear to auscultation bilaterally. No wheezing, rales, or rhonchi.  Abdominal: Soft. Non-distended. No TTP. No rebound, guarding, or rigidity. Good bowel sounds.  Genitourinary: No CVA tenderness.  Vesicular rash to perineum, perianal area  Musculoskeletal: Moves all extremities. No edema.   Skin: Warm and dry.  Neurological: Awake and alert. No acute focal neurological deficits are appreciated.  Psychiatric: Normal affect. Good eye contact. Appropriate in content.      ED Course          Procedures  ED Vital Signs:  Vitals:    12/26/24 1136   BP: (!) 143/88   Pulse: (!) 125   Resp: 18   Temp: 97.6 °F (36.4 °C)   TempSrc: Oral   SpO2: 100%                 Imaging Results:  Imaging Results    None            The Emergency Provider reviewed the vital signs and test results, which are outlined above.    ED Discussion             Medication(s) given in the ER:  Medications - No data to display         Follow-up Information       [facility] In 2 days.    Contact information:  [facility]  [place] [address] [phone]                                    Medication List        START taking these medications      fluconazole 150 MG Tab  Commonly known as: DIFLUCAN  Take 1 tablet (150 mg total) by mouth once daily.     traMADoL 50 mg tablet  Commonly known as: ULTRAM  Take 1 tablet (50 mg total) by mouth every 6 (six) hours as needed for Pain.            ASK your doctor about these medications      acyclovir 400 MG tablet  Commonly known as:  ZOVIRAX     ADDERALL XR 30 MG 24 hr capsule  Generic drug: dextroamphetamine-amphetamine     atorvastatin 20 MG tablet  Commonly known as: LIPITOR     metFORMIN 500 MG tablet  Commonly known as: GLUCOPHAGE     omeprazole 40 MG capsule  Commonly known as: PRILOSEC     TRADJENTA 5 mg Tab tablet  Generic drug: linaGLIPtin               Where to Get Your Medications        These medications were sent to [id number] - [place], [place] - [address] [address] AT [facility] [address] [place] [address] [place] [address] [address] [address] [address]      Phone: [phone]   fluconazole 150 MG Tab  traMADoL 50 mg tablet             Medical Decision Making      Continue antibiotics and acyclovir.  We will treat for probable antibiotic induced yeast infection.  Pain medicine.  Follow up with PCP      All findings were reviewed with the patient/family in detail.   All remaining questions and concerns were addressed at that time.  Patient/family has been counseled regarding the need for follow-up as well as the indication to return to the emergency room should new or worrisome developments occur.        MDM                 Clinical Impression:        ICD-10-CM ICD-9-CM   1. Genital herpes simplex, unspecified site  A60.00 054.10               Bushra Santos, GRACIELA  12/26/24 1349